# Patient Record
Sex: FEMALE | Race: WHITE | URBAN - METROPOLITAN AREA
[De-identification: names, ages, dates, MRNs, and addresses within clinical notes are randomized per-mention and may not be internally consistent; named-entity substitution may affect disease eponyms.]

---

## 2018-10-25 ENCOUNTER — PRE VISIT (OUTPATIENT)
Dept: CARDIOLOGY | Facility: CLINIC | Age: 23
End: 2018-10-25

## 2018-10-25 DIAGNOSIS — Z82.79 FAMILY HISTORY OF BICUSPID AORTIC VALVE: ICD-10-CM

## 2018-10-26 ENCOUNTER — TELEPHONE (OUTPATIENT)
Dept: CARDIOLOGY | Facility: CLINIC | Age: 23
End: 2018-10-26

## 2018-10-26 ENCOUNTER — PRE VISIT (OUTPATIENT)
Dept: CARDIOLOGY | Facility: CLINIC | Age: 23
End: 2018-10-26

## 2018-10-26 DIAGNOSIS — I35.0 AORTIC STENOSIS: Primary | ICD-10-CM

## 2018-10-26 NOTE — TELEPHONE ENCOUNTER
Chart prep: reviewed patient's OV 11/2/18 with Dr. Smiley, per Dr. Smiley's recommendation, have echo, EKG, cmp, CBC, TSH prior to OV.  Attempted to contact patient to set up testing prior to OV, left message for patient to call back. Left message on Mother's phone.    10/29/18 review of appointments, patient has been scheduled for testing and a new date to see Dr. Smiley.

## 2018-10-26 NOTE — TELEPHONE ENCOUNTER
Fax returned from [facility], stated that they have no records of the patient there. Left message for patient to call back to verify PCP for records request.

## 2018-11-20 ENCOUNTER — HOSPITAL ENCOUNTER (OUTPATIENT)
Dept: CARDIOLOGY | Facility: CLINIC | Age: 23
Discharge: HOME | End: 2018-11-20
Attending: INTERNAL MEDICINE | Admitting: INTERNAL MEDICINE
Payer: COMMERCIAL

## 2018-11-20 DIAGNOSIS — I35.0 AORTIC STENOSIS: ICD-10-CM

## 2018-11-20 LAB
ALBUMIN SERPL-MCNC: 3.7 G/DL (ref 3.4–5)
ALP SERPL-CCNC: 44 U/L (ref 40–150)
ALT SERPL W P-5'-P-CCNC: 31 U/L (ref 0–50)
ANION GAP SERPL CALCULATED.3IONS-SCNC: 9 MMOL/L (ref 3–14)
AST SERPL W P-5'-P-CCNC: 27 U/L (ref 0–45)
BILIRUB SERPL-MCNC: 0.5 MG/DL (ref 0.2–1.3)
BUN SERPL-MCNC: 11 MG/DL (ref 7–30)
CALCIUM SERPL-MCNC: 8.8 MG/DL (ref 8.5–10.1)
CHLORIDE SERPL-SCNC: 103 MMOL/L (ref 94–109)
CO2 SERPL-SCNC: 24 MMOL/L (ref 20–32)
CREAT SERPL-MCNC: 0.79 MG/DL (ref 0.52–1.04)
ERYTHROCYTE [DISTWIDTH] IN BLOOD BY AUTOMATED COUNT: 12.1 % (ref 10–15)
GFR SERPL CREATININE-BSD FRML MDRD: 89 ML/MIN/1.7M2
GLUCOSE SERPL-MCNC: 78 MG/DL (ref 70–99)
HCT VFR BLD AUTO: 39.8 % (ref 35–47)
HGB BLD-MCNC: 13.7 G/DL (ref 11.7–15.7)
MCH RBC QN AUTO: 29.9 PG (ref 26.5–33)
MCHC RBC AUTO-ENTMCNC: 34.4 G/DL (ref 31.5–36.5)
MCV RBC AUTO: 87 FL (ref 78–100)
PLATELET # BLD AUTO: 253 10E9/L (ref 150–450)
POTASSIUM SERPL-SCNC: 4 MMOL/L (ref 3.4–5.3)
PROT SERPL-MCNC: 8.1 G/DL (ref 6.8–8.8)
RBC # BLD AUTO: 4.58 10E12/L (ref 3.8–5.2)
SODIUM SERPL-SCNC: 136 MMOL/L (ref 133–144)
TSH SERPL DL<=0.005 MIU/L-ACNC: 1.75 MU/L (ref 0.4–4)
WBC # BLD AUTO: 9.3 10E9/L (ref 4–11)

## 2018-11-20 PROCEDURE — 80053 COMPREHEN METABOLIC PANEL: CPT | Performed by: INTERNAL MEDICINE

## 2018-11-20 PROCEDURE — 85027 COMPLETE CBC AUTOMATED: CPT | Performed by: INTERNAL MEDICINE

## 2018-11-20 PROCEDURE — 93306 TTE W/DOPPLER COMPLETE: CPT | Mod: 26 | Performed by: INTERNAL MEDICINE

## 2018-11-20 PROCEDURE — 84443 ASSAY THYROID STIM HORMONE: CPT | Performed by: INTERNAL MEDICINE

## 2018-11-20 PROCEDURE — 93306 TTE W/DOPPLER COMPLETE: CPT

## 2018-11-20 PROCEDURE — 36415 COLL VENOUS BLD VENIPUNCTURE: CPT | Performed by: INTERNAL MEDICINE

## 2018-11-21 ENCOUNTER — DOCUMENTATION ONLY (OUTPATIENT)
Dept: CARDIOLOGY | Facility: CLINIC | Age: 23
End: 2018-11-21

## 2018-11-30 ENCOUNTER — OFFICE VISIT (OUTPATIENT)
Dept: CARDIOLOGY | Facility: CLINIC | Age: 23
End: 2018-11-30
Payer: COMMERCIAL

## 2018-11-30 VITALS
DIASTOLIC BLOOD PRESSURE: 84 MMHG | BODY MASS INDEX: 23.92 KG/M2 | HEART RATE: 76 BPM | WEIGHT: 135 LBS | SYSTOLIC BLOOD PRESSURE: 116 MMHG | HEIGHT: 63 IN

## 2018-11-30 DIAGNOSIS — Z82.79 FAMILY HISTORY OF BICUSPID AORTIC VALVE: Primary | ICD-10-CM

## 2018-11-30 PROCEDURE — 99213 OFFICE O/P EST LOW 20 MIN: CPT | Performed by: INTERNAL MEDICINE

## 2018-11-30 PROCEDURE — 93000 ELECTROCARDIOGRAM COMPLETE: CPT | Performed by: INTERNAL MEDICINE

## 2018-11-30 NOTE — MR AVS SNAPSHOT
"              After Visit Summary   11/30/2018    Nanda Evans    MRN: [id number]           Patient Information     Date Of Birth          1995        Visit Information        Provider Department      11/30/2018 11:15 AM Maren Smiley MD [facility]   [place]        Today's Diagnoses     Family history of bicuspid aortic valve    -  1       Follow-ups after your visit        Who to contact     If you have questions or need follow up information about today's clinic visit or your schedule please contact [facility] directly at [phone].  Normal or non-critical lab and imaging results will be communicated to you by MyChart, letter or phone within 4 business days after the clinic has received the results. If you do not hear from us within 7 days, please contact the clinic through MyChart or phone. If you have a critical or abnormal lab result, we will notify you by phone as soon as possible.  Submit refill requests through [employer] or call your pharmacy and they will forward the refill request to us. Please allow 3 business days for your refill to be completed.          Additional Information About Your Visit        Care EveryWhere ID     This is your Care EveryWhere ID. This could be used by other organizations to access your [facility] medical records  [id number]        Your Vitals Were     Pulse Height BMI (Body Mass Index)             76 1.6 m (5' 3\") 23.91 kg/m2          Blood Pressure from Last 3 Encounters:   11/30/18 116/84   03/02/12 99/66    Weight from Last 3 Encounters:   11/30/18 61.2 kg (135 lb)   06/12/12 52.2 kg (115 lb) (30 %)*   04/10/12 52.2 kg (115 lb) (31 %)*     * Growth percentiles are based on CDC 2-20 Years data.              We Performed the Following     EKG 12-lead complete w/read - Clinics (performed today)        Primary Care Provider Office Phone # Fax #    Juliana Lima [phone]       " [address]  [facility] [address]        Equal Access to Services     ЕЛЕНАNUSRAT PRISCILLA : Hadii aad ku hadborisenma Sojoselito, waaxda luqadaha, qaybta kaalmada anitharamakrishna, deangelo chisholm shashanktae davidshon heath nataliya olmstead. So [facility] [phone].    ATENCIÓN: Si habla español, tiene a he disposición servicios gratuitos de asistencia lingüística. Llame al [phone].    We comply with applicable federal civil rights laws and [place] laws. We do not discriminate on the basis of race, color, national origin, age, disability, sex, sexual orientation, or gender identity.            Thank you!     Thank you for choosing [facility]  for your care. Our goal is always to provide you with excellent care. Hearing back from our patients is one way we can continue to improve our services. Please take a few minutes to complete the written survey that you may receive in the mail after your visit with us. Thank you!             Your Updated Medication List - Protect others around you: Learn how to safely use, store and throw away your medicines at www.disposemymeds.org.          This list is accurate as of 11/30/18 12:06 PM.  Always use your most recent med list.                   Brand Name Dispense Instructions for use Diagnosis    LOESTRIN 1/20 (21) 1-20 MG-MCG tablet   Generic drug:  norethindrone-ethinyl estradiol      Take 1 tablet by mouth daily.

## 2018-11-30 NOTE — LETTER
11/30/2018    Juliana Lima  [address] [facility] [address]    RE: Nanda Evans       Dear Colleague,    I had the pleasure of seeing Nanda Evans in the [facility].    Clinic visit note dictated. Dictation reference number - [id number]        REVIEW OF SYSTEMS:  A comprehensive 10-point review of systems was completed and the pertinent positives are documented in the history of present illness.    Skin:  Negative     Eyes:  Positive for glasses;contacts  ENT:  Negative    Respiratory:  Negative    Cardiovascular:    palpitations;Positive for;lightheadedness;dizziness  Gastroenterology: Negative    Genitourinary:  not assessed    Musculoskeletal:  Negative    Neurologic:  Positive for headaches  Psychiatric:  Positive for sleep disturbances  Heme/Lymph/Imm:  Negative    Endocrine:  Negative      CURRENT MEDICATIONS:  Current Outpatient Prescriptions   Medication Sig Dispense Refill     norethindrone-ethinyl estradiol (LOESTRIN 1/20, 21,) 1-20 MG-MCG per tablet Take 1 tablet by mouth daily.           ALLERGIES:  No Known Allergies    PAST MEDICAL HISTORY:    Past Medical History:   Diagnosis Date     Family history of bicuspid aortic valve      Osgood-Schlatter's disease      Patellar subluxation     LEFT- RECURRENT       PAST SURGICAL HISTORY:    Past Surgical History:   Procedure Laterality Date     ARTHROSCOPY KNEE WITH PATELLAR REALIGNMENT  3/2/2012    Procedure:ARTHROSCOPY KNEE WITH PATELLAR REALIGNMENT; Left Knee Arthroscopic Medial Patellofemoral Ligament Reconstruction and Lateral Retinacular Lengthening  ; Surgeon:KAREN ADAMS; [facility]     MPFL RECONSTRUCTION AND RETINACULAR LENGTHENING[      RIGHT KNEE       FAMILY HISTORY:    Family History   Problem Relation Age of Onset     Hypertension Maternal Grandmother      Arrhythmia Maternal Grandfather      bradycardia, pacemaker     Heart Defect Brother        SOCIAL HISTORY:    Social  "History     Social History     Marital status: Single     Spouse name: N/A     Number of children: N/A     Years of education: N/A     Social History Main Topics     Smoking status: Never Smoker     Smokeless tobacco: Never Used     Alcohol use Yes      Comment: socially, not often     Drug use: No     Sexual activity: Not Asked     Other Topics Concern     None     Social History Narrative       PHYSICAL EXAM:    Vitals: /84  Pulse 76  Ht 1.6 m (5' 3\")  Wt 61.2 kg (135 lb)  BMI 23.91 kg/m2  Wt Readings from Last 5 Encounters:   11/30/18 61.2 kg (135 lb)   06/12/12 52.2 kg (115 lb) (30 %)*   04/10/12 52.2 kg (115 lb) (31 %)*   03/16/12 52.2 kg (115 lb) (31 %)*   03/02/12 53.1 kg (117 lb) (36 %)*     * Growth percentiles are based on [facility] 2-20 Years data.       Constitutional: Comfortable at rest. Cooperative, alert and oriented, well developed, well nourished.  Eyes: Pupils equal and round, conjunctivae and lids unremarkable, sclera white, no xanthalasma,   ENT: Satisfactory dentition.  No cyanosis or pallor.  Neck: Carotid pulses are full and equal bilaterally, no carotid bruit, no thyromegaly     Respiratory: Normal respiratory effort with symmetrical chest wall movements and no use of accessory muscles. Good air entry with normal breath sounds and no rales or wheeze.  Cardiovascular: Normal jugular venous pulse and pressure.  Normal carotid pulse character and volume.  No carotid bruit.  Apical impulse is undisplaced and normal to palpation without parasternal heave or thrill.  Heart sounds are normal and regular. No audible murmur. No S3, S4 or friction rub.    GI: Soft, nontender, no hepatosplenomegaly, no masses, active bowel sounds.    Skin: No rash, erythema, ecchymosis.  Musculoskeletal: Normal muscle tone and strength. Normal gait. No spinal deformities.  Neuropsychiatric: Oriented to time place and person.  Affect normal.  No gross motor deficits.  Extremities: No edema. No clubbing or " deformities.        Thank you for allowing me to participate in the care of your patient.      Sincerely,     Maren Smiley MD     [facility]    cc:   Juliana Lima  [address] [address]

## 2018-11-30 NOTE — PROGRESS NOTES
Service Date: 2018   [facility], [facility].       PRIMARY CARE AND REFERRING PROVIDER:  Dr. Juliana Lima, [place]  [address].      REASON FOR VISIT:   1.  To discuss results of screening echocardiogram in the context of family history of bicuspid aortic valve disease in brother.      HISTORY OF PRESENT ILLNESS:    Nanda is new to Cardiology.  She is a delightful 23-year-old, nonobese,  lady, employed as a .      Recently her brother was diagnosed with bicuspid aortic valve.  In fact, he is my patient and actively plays basketball.  Stemming from that recommendation, Nanda underwent a screening transthoracic echocardiogram.  I reviewed the images with her.  She has a normal trileaflet valve without regurgitation or stenosis.  Normal cardiac valves.  Normal biventricular systolic function.  Normal dimension of the aortic root and ascending aorta.  Nanda is asymptomatic.  Apart from a contraceptive pill, she is not on any medications and is not known to have any medical issues.      ECG done today shows normal sinus rhythm at 75 BPM with normal cardiac intervals (QTc 396 milliseconds,  milliseconds).      PHYSICAL EXAMINATION:   VITAL SIGNS:  /84, pulse 76 per minute, weight 61.2 kg (135 pounds), BMI 24 kg/m2.   Her physical exam is completely unremarkable.      DIAGNOSIS:   1.  Screening for bicuspid aortic valve, due to family history in brother.  Echocardiogram confirms normal trileaflet aortic valve and ascending aorta dimensions.  She does not require additional screening.      It was my pleasure to visit with this verena lady.      cc:   Juliana Lima MD    [address]  [address]         [facility] IZZY SALAS MD             D: 2018   T: 2018   MT: OELG      Name:     NANDA VILLARREAL   MRN:      [id number]        Account:      [id number]   :      1995            Service Date: 11/30/2018      Document: [id number]

## 2018-11-30 NOTE — LETTER
11/30/2018      Juliana Lima  [address]  [facility] [address]      RE: Nanda Evans       Dear Colleague,    I had the pleasure of seeing Nanda Evans in the [facility].    Service Date: 11/30/2018   [facility], [facility].       PRIMARY CARE AND REFERRING PROVIDER:  Dr. Juliana Lima, [facility], [place]  [address].      REASON FOR VISIT:   1.  To discuss results of screening echocardiogram in the context of family history of bicuspid aortic valve disease in brother.      HISTORY OF PRESENT ILLNESS:    Nanda is new to Cardiology.  She is a delightful 23-year-old, nonobese,  lady, employed as a .      Recently her brother was diagnosed with bicuspid aortic valve.  In fact, he is my patient and actively plays basketball.  Stemming from that recommendation, Nanda underwent a screening transthoracic echocardiogram.  I reviewed the images with her.  She has a normal trileaflet valve without regurgitation or stenosis.  Normal cardiac valves.  Normal biventricular systolic function.  Normal dimension of the aortic root and ascending aorta.  Nanda is asymptomatic.  Apart from a contraceptive pill, she is not on any medications and is not known to have any medical issues.      ECG done today shows normal sinus rhythm at 75 BPM with normal cardiac intervals (QTc 396 milliseconds,  milliseconds).      PHYSICAL EXAMINATION:   VITAL SIGNS:  /84, pulse 76 per minute, weight 61.2 kg (135 pounds), BMI 24 kg/m2.   Her physical exam is completely unremarkable.      DIAGNOSIS:   1.  Screening for bicuspid aortic valve, due to family history in brother.  Echocardiogram confirms normal trileaflet aortic valve and ascending aorta dimensions.  She does not require additional screening.      It was my pleasure to visit with this verena lady.      cc:   Juliana Lima MD    [facility]     [address]  [address]         MAREN SMILEY MD             D: 2018   T: 2018   MT: OLEG      Name:     TEVIN VILLARREAL   MRN:      [id number]        Account:      [id number]   :      1995           Service Date: 2018      Document: [id number]           Outpatient Encounter Prescriptions as of 2018   Medication Sig Dispense Refill     norethindrone-ethinyl estradiol (LOESTRIN , ,) 1-20 MG-MCG per tablet Take 1 tablet by mouth daily.       No facility-administered encounter medications on file as of 2018.        Again, thank you for allowing me to participate in the care of your patient.      Sincerely,    Maren Smiley MD     [facility]

## 2018-11-30 NOTE — PROGRESS NOTES
Clinic visit note dictated. Dictation reference number - [id number]        REVIEW OF SYSTEMS:  A comprehensive 10-point review of systems was completed and the pertinent positives are documented in the history of present illness.    Skin:  Negative     Eyes:  Positive for glasses;contacts  ENT:  Negative    Respiratory:  Negative    Cardiovascular:    palpitations;Positive for;lightheadedness;dizziness  Gastroenterology: Negative    Genitourinary:  not assessed    Musculoskeletal:  Negative    Neurologic:  Positive for headaches  Psychiatric:  Positive for sleep disturbances  Heme/Lymph/Imm:  Negative    Endocrine:  Negative      CURRENT MEDICATIONS:  Current Outpatient Prescriptions   Medication Sig Dispense Refill     norethindrone-ethinyl estradiol (LOESTRIN 1/20, 21,) 1-20 MG-MCG per tablet Take 1 tablet by mouth daily.           ALLERGIES:  No Known Allergies    PAST MEDICAL HISTORY:    Past Medical History:   Diagnosis Date     Family history of bicuspid aortic valve      Osgood-Schlatter's disease      Patellar subluxation     LEFT- RECURRENT       PAST SURGICAL HISTORY:    Past Surgical History:   Procedure Laterality Date     ARTHROSCOPY KNEE WITH PATELLAR REALIGNMENT  3/2/2012    Procedure:ARTHROSCOPY KNEE WITH PATELLAR REALIGNMENT; Left Knee Arthroscopic Medial Patellofemoral Ligament Reconstruction and Lateral Retinacular Lengthening  ; Surgeon:KAREN ADAMS; Location:[facility]     MPFL RECONSTRUCTION AND RETINACULAR LENGTHENING[      RIGHT KNEE       FAMILY HISTORY:    Family History   Problem Relation Age of Onset     Hypertension Maternal Grandmother      Arrhythmia Maternal Grandfather      bradycardia, pacemaker     Heart Defect Brother        SOCIAL HISTORY:    Social History     Social History     Marital status: Single     Spouse name: N/A     Number of children: N/A     Years of education: N/A     Social History Main Topics     Smoking status: Never Smoker     Smokeless tobacco: Never Used      "Alcohol use Yes      Comment: socially, not often     Drug use: No     Sexual activity: Not Asked     Other Topics Concern     None     Social History Narrative       PHYSICAL EXAM:    Vitals: /84  Pulse 76  Ht 1.6 m (5' 3\")  Wt 61.2 kg (135 lb)  BMI 23.91 kg/m2  Wt Readings from Last 5 Encounters:   11/30/18 61.2 kg (135 lb)   06/12/12 52.2 kg (115 lb) (30 %)*   04/10/12 52.2 kg (115 lb) (31 %)*   03/16/12 52.2 kg (115 lb) (31 %)*   03/02/12 53.1 kg (117 lb) (36 %)*     * Growth percentiles are based on CDC 2-20 Years data.       Constitutional: Comfortable at rest. Cooperative, alert and oriented, well developed, well nourished.  Eyes: Pupils equal and round, conjunctivae and lids unremarkable, sclera white, no xanthalasma,   ENT: Satisfactory dentition.  No cyanosis or pallor.  Neck: Carotid pulses are full and equal bilaterally, no carotid bruit, no thyromegaly     Respiratory: Normal respiratory effort with symmetrical chest wall movements and no use of accessory muscles. Good air entry with normal breath sounds and no rales or wheeze.  Cardiovascular: Normal jugular venous pulse and pressure.  Normal carotid pulse character and volume.  No carotid bruit.  Apical impulse is undisplaced and normal to palpation without parasternal heave or thrill.  Heart sounds are normal and regular. No audible murmur. No S3, S4 or friction rub.    GI: Soft, nontender, no hepatosplenomegaly, no masses, active bowel sounds.    Skin: No rash, erythema, ecchymosis.  Musculoskeletal: Normal muscle tone and strength. Normal gait. No spinal deformities.  Neuropsychiatric: Oriented to time place and person.  Affect normal.  No gross motor deficits.  Extremities: No edema. No clubbing or deformities.      "

## 2018-12-03 ENCOUNTER — DOCUMENTATION ONLY (OUTPATIENT)
Dept: CARDIOLOGY | Facility: CLINIC | Age: 23
End: 2018-12-03

## 2018-12-03 NOTE — PROGRESS NOTES
Message from Dr. Smiley:  Saw the patient in clinic today.  Please fax a copy of my clinic letter and patient's echocardiogram report to the patient's primary provider in the [place] system.     Thank you.   Dr. Smiley     Office note 11/30/18 and echo 11/20/18 routed to Dr. Lima.